# Patient Record
Sex: FEMALE | Race: WHITE
[De-identification: names, ages, dates, MRNs, and addresses within clinical notes are randomized per-mention and may not be internally consistent; named-entity substitution may affect disease eponyms.]

---
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
## 2019-06-13 ENCOUNTER — HOSPITAL ENCOUNTER (EMERGENCY)
Dept: HOSPITAL 9 - MADERS | Age: 19
Discharge: HOME | End: 2019-06-13
Payer: COMMERCIAL

## 2019-06-13 DIAGNOSIS — F17.210: ICD-10-CM

## 2019-06-13 DIAGNOSIS — J06.9: Primary | ICD-10-CM

## 2019-06-13 DIAGNOSIS — J45.909: ICD-10-CM

## 2019-06-13 PROCEDURE — 99283 EMERGENCY DEPT VISIT LOW MDM: CPT

## 2020-02-25 ENCOUNTER — HOSPITAL ENCOUNTER (EMERGENCY)
Dept: HOSPITAL 9 - MADERS | Age: 20
Discharge: HOME | End: 2020-02-25
Payer: SELF-PAY

## 2020-02-25 DIAGNOSIS — F17.210: ICD-10-CM

## 2020-02-25 DIAGNOSIS — R10.31: Primary | ICD-10-CM

## 2020-02-25 DIAGNOSIS — J45.909: ICD-10-CM

## 2020-02-25 DIAGNOSIS — Z79.51: ICD-10-CM

## 2020-02-25 LAB
ANION GAP SERPL CALC-SCNC: 15 MMOL/L (ref 10–20)
BASOPHILS # BLD AUTO: 0.1 THOU/UL (ref 0–0.2)
BASOPHILS NFR BLD AUTO: 1.7 % (ref 0–1)
BUN SERPL-MCNC: 10 MG/DL (ref 7–18.7)
CALCIUM SERPL-MCNC: 9.7 MG/DL (ref 7.8–10.44)
CHLORIDE SERPL-SCNC: 107 MMOL/L (ref 98–107)
CO2 SERPL-SCNC: 24 MMOL/L (ref 22–29)
CREAT CL PREDICTED SERPL C-G-VRATE: 0 ML/MIN (ref 70–130)
EOSINOPHIL # BLD AUTO: 0 THOU/UL (ref 0–0.7)
EOSINOPHIL NFR BLD AUTO: 0.4 % (ref 0–10)
GLUCOSE SERPL-MCNC: 94 MG/DL (ref 70–105)
HGB BLD-MCNC: 14.3 G/DL (ref 12–16)
LYMPHOCYTES # BLD AUTO: 2.6 THOU/UL (ref 1.2–3.4)
LYMPHOCYTES NFR BLD AUTO: 36.3 % (ref 28–48)
MCH RBC QN AUTO: 28 PG (ref 25–35)
MCV RBC AUTO: 87.7 FL (ref 78–98)
MONOCYTES # BLD AUTO: 0.5 THOU/UL (ref 0.11–0.59)
MONOCYTES NFR BLD AUTO: 7.4 % (ref 0–4)
NEUTROPHILS # BLD AUTO: 3.9 THOU/UL (ref 1.4–6.5)
NEUTROPHILS NFR BLD AUTO: 54.2 % (ref 31–61)
PLATELET # BLD AUTO: 236 THOU/UL (ref 130–400)
POTASSIUM SERPL-SCNC: 3.5 MMOL/L (ref 3.5–5.1)
PREGU CONTROL BACKGROUND?: (no result)
PREGU CONTROL BAR APPEAR?: YES
RBC # BLD AUTO: 5.12 MILL/UL (ref 4–5.2)
SODIUM SERPL-SCNC: 142 MMOL/L (ref 136–145)
WBC # BLD AUTO: 7.1 THOU/UL (ref 4.8–10.8)

## 2020-02-25 PROCEDURE — 96360 HYDRATION IV INFUSION INIT: CPT

## 2020-02-25 PROCEDURE — 85025 COMPLETE CBC W/AUTO DIFF WBC: CPT

## 2020-02-25 PROCEDURE — 81003 URINALYSIS AUTO W/O SCOPE: CPT

## 2020-02-25 PROCEDURE — 81025 URINE PREGNANCY TEST: CPT

## 2020-02-25 PROCEDURE — 74177 CT ABD & PELVIS W/CONTRAST: CPT

## 2020-02-25 PROCEDURE — 80048 BASIC METABOLIC PNL TOTAL CA: CPT

## 2020-02-25 PROCEDURE — 87086 URINE CULTURE/COLONY COUNT: CPT

## 2020-02-25 NOTE — CT
CT ABDOMEN AND PELVIS WITH CONTRAST:

2/25/20

 

COMPARISON: 

None.

 

HISTORY: 

Right lower quadrant abdominal pain. 

 

TECHNIQUE:  

Multiple contiguous axial images are obtained in a CT of the abdomen and pelvis with contrast. Sagitt
al and coronal reformats were performed.

 

FINDINGS: 

The liver, gallbladder, kidney, adrenal glands, spleen, and pancreas are unremarkable. No free air, f
ree fluid, or stranding changes are seen in the abdomen or pelvis. 

 

An IUD is seen in the uterus. The large and small bowel are unremarkable. The appendix is normal. No 
abdominal or pelvic lymphadenopathy are seen. 

 

The osseous structures, visualized inferior thorax and abdominal wall soft tissues are unremarkable. 


 

IMPRESSION: 

No evidence of acute intra-abdominal/pelvic abnormality.

 

 

 

POS: C